# Patient Record
Sex: FEMALE | Race: WHITE | NOT HISPANIC OR LATINO | Employment: OTHER | URBAN - METROPOLITAN AREA
[De-identification: names, ages, dates, MRNs, and addresses within clinical notes are randomized per-mention and may not be internally consistent; named-entity substitution may affect disease eponyms.]

---

## 2018-04-11 NOTE — PROCEDURE NOTE: CLINICAL
PROCEDURE NOTE: Punctal Plugs, Darby Isauro ([id number], [id number]) #2 Bilateral Lower Lids. qunitess 0.4mm.

## 2018-06-14 NOTE — PATIENT DISCUSSION
placed BCL in eye. Cont with Refresh q1h OS. Advised to call immediately if worsening eye pain or loss of vision.  Antibiotic qam.

## 2018-07-12 NOTE — PATIENT DISCUSSION
The patient has a history of recurrent corneal erosion. This is a spontaneous corneal abrasion that recurrs due to previous corneal injury . It manifests itself by a sudden corneal defect, usually upon awakening and typically may heal during the day. I have recommended a preventative regimen of ocular lubricating ointment or Andrea 128 5% ointment to be used at bedtime for a minimum of 6 months in addition to increasing Dry Eye treatment.

## 2018-07-12 NOTE — PATIENT DISCUSSION
Continue current management and add more omega 3 oral vitamins, like Flaxseed Oil of at least 3,000 mg a day. Start daily 10 minute warm compresses on eyelids.

## 2018-08-08 NOTE — PATIENT DISCUSSION
Continue current management and add more omega 3 oral vitamins, like Flaxseed Oil of at least 3,000 mg a day may increase to over 5,000 mg. Start daily 10 minute warm compresses on eyelids.

## 2018-08-08 NOTE — PATIENT DISCUSSION
Discussed SK, MEM would like to wait to see if 6 months of ointment will be enough. Pt also wants to try patching, MEM suggested ointment when patching.

## 2019-04-10 NOTE — PATIENT DISCUSSION
Continue current management: omega 3 oral vitamins, like Flaxseed Oil of at least 3,000 mg a day may increase to over 5,000 mg. Recommended daily 10 minute warm compresses on eyelids.

## 2020-05-28 ENCOUNTER — ESTABLISHED COMPREHENSIVE EXAM (OUTPATIENT)
Dept: URBAN - METROPOLITAN AREA CLINIC 38 | Facility: CLINIC | Age: 25
End: 2020-05-28

## 2020-05-28 DIAGNOSIS — H52.13: ICD-10-CM

## 2020-05-28 DIAGNOSIS — Z97.3: ICD-10-CM

## 2020-05-28 PROCEDURE — 92014 COMPRE OPH EXAM EST PT 1/>: CPT

## 2020-05-28 PROCEDURE — 92015 DETERMINE REFRACTIVE STATE: CPT

## 2020-05-28 PROCEDURE — 92310-1 LEVEL 1 CONTACT LENS MANAGEMENT

## 2020-05-28 RX ORDER — OLOPATADINE HYDROCHLORIDE 2 MG/ML: 1 SOLUTION OPHTHALMIC ONCE A DAY

## 2020-05-28 RX ORDER — LOTEPREDNOL ETABONATE 5 MG/ML
1 SUSPENSION/ DROPS OPHTHALMIC TWICE A DAY
Start: 2020-05-28 | End: 2020-06-09

## 2020-05-28 ASSESSMENT — VISUAL ACUITY
OU_CC: J2
OS_CC: J2
OD_CC: 20/25
OS_CC: 20/25
OU_CC: 20/20
OD_CC: J2

## 2020-05-28 ASSESSMENT — TONOMETRY
OS_IOP_MMHG: 12
OD_IOP_MMHG: 12

## 2023-08-17 ENCOUNTER — COMPREHENSIVE EXAM (OUTPATIENT)
Dept: URBAN - METROPOLITAN AREA CLINIC 38 | Facility: CLINIC | Age: 28
End: 2023-08-17

## 2023-08-17 DIAGNOSIS — Z97.3: ICD-10-CM

## 2023-08-17 DIAGNOSIS — Z01.00: ICD-10-CM

## 2023-08-17 DIAGNOSIS — H52.13: ICD-10-CM

## 2023-08-17 PROCEDURE — 92310-1 LEVEL 1 CONTACT LENS MANAGEMENT

## 2023-08-17 PROCEDURE — 92015 DETERMINE REFRACTIVE STATE: CPT

## 2023-08-17 PROCEDURE — 92014 COMPRE OPH EXAM EST PT 1/>: CPT

## 2023-08-17 RX ORDER — OLOPATADINE HYDROCHLORIDE 2 MG/ML
1 SOLUTION OPHTHALMIC EVERY MORNING
Start: 2023-08-17

## 2023-08-17 ASSESSMENT — VISUAL ACUITY
OS_CC: J1
OD_CC: 20/20-1
OU_CC: 20/20
OU_CC: J1
OU_OTHER: 20/20 OU W/ OR
OS_CC: 20/20-2
OD_CC: J1

## 2023-08-17 ASSESSMENT — TONOMETRY
OD_IOP_MMHG: 14
OS_IOP_MMHG: 14